# Patient Record
Sex: MALE | Race: WHITE | NOT HISPANIC OR LATINO
[De-identification: names, ages, dates, MRNs, and addresses within clinical notes are randomized per-mention and may not be internally consistent; named-entity substitution may affect disease eponyms.]

---

## 2019-02-09 ENCOUNTER — TRANSCRIPTION ENCOUNTER (OUTPATIENT)
Age: 64
End: 2019-02-09

## 2023-02-27 PROBLEM — Z00.00 ENCOUNTER FOR PREVENTIVE HEALTH EXAMINATION: Status: ACTIVE | Noted: 2023-02-27

## 2023-04-06 ENCOUNTER — NON-APPOINTMENT (OUTPATIENT)
Age: 68
End: 2023-04-06

## 2023-04-10 ENCOUNTER — APPOINTMENT (OUTPATIENT)
Dept: OTOLARYNGOLOGY | Facility: CLINIC | Age: 68
End: 2023-04-10
Payer: COMMERCIAL

## 2023-04-17 ENCOUNTER — NON-APPOINTMENT (OUTPATIENT)
Age: 68
End: 2023-04-17

## 2023-04-17 ENCOUNTER — APPOINTMENT (OUTPATIENT)
Dept: OTOLARYNGOLOGY | Facility: CLINIC | Age: 68
End: 2023-04-17
Payer: COMMERCIAL

## 2023-04-17 VITALS — BODY MASS INDEX: 30.31 KG/M2 | HEIGHT: 68.11 IN | WEIGHT: 200 LBS

## 2023-04-17 DIAGNOSIS — H61.21 IMPACTED CERUMEN, RIGHT EAR: ICD-10-CM

## 2023-04-17 DIAGNOSIS — H93.8X3 OTHER SPECIFIED DISORDERS OF EAR, BILATERAL: ICD-10-CM

## 2023-04-17 PROCEDURE — 99204 OFFICE O/P NEW MOD 45 MIN: CPT | Mod: 25

## 2023-04-17 PROCEDURE — G0268 REMOVAL OF IMPACTED WAX MD: CPT

## 2023-04-17 PROCEDURE — 92557 COMPREHENSIVE HEARING TEST: CPT

## 2023-04-17 PROCEDURE — 92550 TYMPANOMETRY & REFLEX THRESH: CPT

## 2023-04-17 RX ORDER — CIPROFLOXACIN AND DEXAMETHASONE 3; 1 MG/ML; MG/ML
0.3-0.1 SUSPENSION/ DROPS AURICULAR (OTIC) TWICE DAILY
Qty: 1 | Refills: 1 | Status: ACTIVE | COMMUNITY
Start: 2023-04-17 | End: 1900-01-01

## 2023-04-17 NOTE — HISTORY OF PRESENT ILLNESS
[de-identified] : Patient presents today c/o b/l hearing loss, left ear otalgia ,  Patient states for a few months both his ears feel clogged and he can not hear well .  He has some left ear otalgia and drainage.

## 2023-04-17 NOTE — REASON FOR VISIT
[Initial Evaluation] : an initial evaluation for [FreeTextEntry2] : b/l hearing loss, left ear otalgia ,

## 2023-05-18 ENCOUNTER — APPOINTMENT (OUTPATIENT)
Dept: OTOLARYNGOLOGY | Facility: CLINIC | Age: 68
End: 2023-05-18
Payer: COMMERCIAL

## 2023-05-18 DIAGNOSIS — H90.3 SENSORINEURAL HEARING LOSS, BILATERAL: ICD-10-CM

## 2023-05-18 DIAGNOSIS — H60.392 OTHER INFECTIVE OTITIS EXTERNA, LEFT EAR: ICD-10-CM

## 2023-05-18 PROCEDURE — 99213 OFFICE O/P EST LOW 20 MIN: CPT

## 2023-05-18 NOTE — REASON FOR VISIT
[Subsequent Evaluation] : a subsequent evaluation for [FreeTextEntry2] : b/l hearing loss, left otalgia, hoarseness

## 2023-05-18 NOTE — ASSESSMENT
[FreeTextEntry1] : AOE resolved\par Declines HAs\par Discussed dry ear precautions and indications to return

## 2023-05-18 NOTE — HISTORY OF PRESENT ILLNESS
[de-identified] : Pt states the ear pain has resolved but that he woke up with some hoarseness this morning.

## 2024-09-13 ENCOUNTER — NON-APPOINTMENT (OUTPATIENT)
Age: 69
End: 2024-09-13

## 2024-09-14 ENCOUNTER — INPATIENT (INPATIENT)
Facility: HOSPITAL | Age: 69
LOS: 2 days | Discharge: HOME | DRG: 305 | End: 2024-09-17
Attending: HOSPITALIST | Admitting: STUDENT IN AN ORGANIZED HEALTH CARE EDUCATION/TRAINING PROGRAM
Payer: COMMERCIAL

## 2024-09-14 VITALS
RESPIRATION RATE: 18 BRPM | HEART RATE: 111 BPM | OXYGEN SATURATION: 96 % | SYSTOLIC BLOOD PRESSURE: 216 MMHG | TEMPERATURE: 98 F | DIASTOLIC BLOOD PRESSURE: 110 MMHG

## 2024-09-14 LAB
ALBUMIN SERPL ELPH-MCNC: 4.3 G/DL — SIGNIFICANT CHANGE UP (ref 3.5–5.2)
ALP SERPL-CCNC: 59 U/L — SIGNIFICANT CHANGE UP (ref 30–115)
ALT FLD-CCNC: 15 U/L — SIGNIFICANT CHANGE UP (ref 0–41)
ANION GAP SERPL CALC-SCNC: 11 MMOL/L — SIGNIFICANT CHANGE UP (ref 7–14)
AST SERPL-CCNC: 19 U/L — SIGNIFICANT CHANGE UP (ref 0–41)
BASOPHILS # BLD AUTO: 0.03 K/UL — SIGNIFICANT CHANGE UP (ref 0–0.2)
BASOPHILS NFR BLD AUTO: 0.4 % — SIGNIFICANT CHANGE UP (ref 0–1)
BILIRUB SERPL-MCNC: 0.2 MG/DL — SIGNIFICANT CHANGE UP (ref 0.2–1.2)
BUN SERPL-MCNC: 19 MG/DL — SIGNIFICANT CHANGE UP (ref 10–20)
CALCIUM SERPL-MCNC: 9.3 MG/DL — SIGNIFICANT CHANGE UP (ref 8.4–10.5)
CHLORIDE SERPL-SCNC: 108 MMOL/L — SIGNIFICANT CHANGE UP (ref 98–110)
CO2 SERPL-SCNC: 25 MMOL/L — SIGNIFICANT CHANGE UP (ref 17–32)
CREAT SERPL-MCNC: 1.3 MG/DL — SIGNIFICANT CHANGE UP (ref 0.7–1.5)
EGFR: 60 ML/MIN/1.73M2 — SIGNIFICANT CHANGE UP
EOSINOPHIL # BLD AUTO: 0.12 K/UL — SIGNIFICANT CHANGE UP (ref 0–0.7)
EOSINOPHIL NFR BLD AUTO: 1.7 % — SIGNIFICANT CHANGE UP (ref 0–8)
GLUCOSE SERPL-MCNC: 109 MG/DL — HIGH (ref 70–99)
HCT VFR BLD CALC: 42 % — SIGNIFICANT CHANGE UP (ref 42–52)
HGB BLD-MCNC: 13.9 G/DL — LOW (ref 14–18)
IMM GRANULOCYTES NFR BLD AUTO: 0.1 % — SIGNIFICANT CHANGE UP (ref 0.1–0.3)
LYMPHOCYTES # BLD AUTO: 2.02 K/UL — SIGNIFICANT CHANGE UP (ref 1.2–3.4)
LYMPHOCYTES # BLD AUTO: 28.7 % — SIGNIFICANT CHANGE UP (ref 20.5–51.1)
MCHC RBC-ENTMCNC: 30.6 PG — SIGNIFICANT CHANGE UP (ref 27–31)
MCHC RBC-ENTMCNC: 33.1 G/DL — SIGNIFICANT CHANGE UP (ref 32–37)
MCV RBC AUTO: 92.5 FL — SIGNIFICANT CHANGE UP (ref 80–94)
MONOCYTES # BLD AUTO: 0.84 K/UL — HIGH (ref 0.1–0.6)
MONOCYTES NFR BLD AUTO: 11.9 % — HIGH (ref 1.7–9.3)
NEUTROPHILS # BLD AUTO: 4.03 K/UL — SIGNIFICANT CHANGE UP (ref 1.4–6.5)
NEUTROPHILS NFR BLD AUTO: 57.2 % — SIGNIFICANT CHANGE UP (ref 42.2–75.2)
NRBC # BLD: 0 /100 WBCS — SIGNIFICANT CHANGE UP (ref 0–0)
PLATELET # BLD AUTO: 238 K/UL — SIGNIFICANT CHANGE UP (ref 130–400)
PMV BLD: 10.7 FL — HIGH (ref 7.4–10.4)
POTASSIUM SERPL-MCNC: 4.2 MMOL/L — SIGNIFICANT CHANGE UP (ref 3.5–5)
POTASSIUM SERPL-SCNC: 4.2 MMOL/L — SIGNIFICANT CHANGE UP (ref 3.5–5)
PROT SERPL-MCNC: 6.9 G/DL — SIGNIFICANT CHANGE UP (ref 6–8)
RBC # BLD: 4.54 M/UL — LOW (ref 4.7–6.1)
RBC # FLD: 13.4 % — SIGNIFICANT CHANGE UP (ref 11.5–14.5)
SODIUM SERPL-SCNC: 144 MMOL/L — SIGNIFICANT CHANGE UP (ref 135–146)
TROPONIN T, HIGH SENSITIVITY RESULT: 22 NG/L — HIGH (ref 6–21)
WBC # BLD: 7.05 K/UL — SIGNIFICANT CHANGE UP (ref 4.8–10.8)
WBC # FLD AUTO: 7.05 K/UL — SIGNIFICANT CHANGE UP (ref 4.8–10.8)

## 2024-09-14 PROCEDURE — 99285 EMERGENCY DEPT VISIT HI MDM: CPT

## 2024-09-14 PROCEDURE — 93010 ELECTROCARDIOGRAM REPORT: CPT

## 2024-09-14 RX ORDER — HYDRALAZINE HCL 50 MG
25 TABLET ORAL ONCE
Refills: 0 | Status: COMPLETED | OUTPATIENT
Start: 2024-09-14 | End: 2024-09-14

## 2024-09-14 RX ORDER — NICARDIPINE HCL 20 MG
5 CAPSULE ORAL
Qty: 40 | Refills: 0 | Status: DISCONTINUED | OUTPATIENT
Start: 2024-09-14 | End: 2024-09-15

## 2024-09-14 RX ORDER — HYDRALAZINE HCL 50 MG
10 TABLET ORAL ONCE
Refills: 0 | Status: COMPLETED | OUTPATIENT
Start: 2024-09-14 | End: 2024-09-14

## 2024-09-14 RX ADMIN — Medication 25 MILLIGRAM(S): at 20:58

## 2024-09-14 RX ADMIN — Medication 25 MG/HR: at 23:37

## 2024-09-14 RX ADMIN — Medication 10 MILLIGRAM(S): at 22:44

## 2024-09-14 NOTE — ED ADULT NURSE NOTE - NSFALLUNIVINTERV_ED_ALL_ED
Assistance with ambulation/Communicate risk of Fall with Harm to all staff, patient, and family/Monitor gait and stability/Provide visual cue: red socks, yellow wristband, yellow gown, etc/Review medications for side effects contributing to fall risk/Bed/Stretcher in lowest position, wheels locked, appropriate side rails in place/Call bell, personal items and telephone in reach/Instruct patient to call for assistance before getting out of bed/chair/stretcher/Non-slip footwear applied when patient is off stretcher/[place] to call system/Physically safe environment - no spills, clutter or unnecessary equipment/Purposeful proactive rounding/Room/bathroom lighting operational, light cord in reach

## 2024-09-14 NOTE — ED PROVIDER NOTE - NS ED ATTENDING STATEMENT MOD
I have seen and examined this patient and fully participated in the care of this patient as the teaching attending.  The service was shared with the PRICILA.  I reviewed and verified the documentation.

## 2024-09-14 NOTE — ED ADULT NURSE NOTE - OBJECTIVE STATEMENT
pt presents with HTN, states he takes PO meds 3x daily but 3 hours ago, BP was too high. states he had L arm tingling and a headache that has minimized. pt denies chest pain and has slight dizziness. pt a/ox4. states he took his night PO meds before coming. pt placed on cardiac monitor

## 2024-09-14 NOTE — ED PROVIDER NOTE - OBJECTIVE STATEMENT
69-year-old male with a past medical history of hypertension, and history of renal cyst presents to the ED for evaluation of elevated blood pressure and headache that began today.  Patient reports his blood pressure is normally 140/90.  Patient reports he checked his blood pressure today and it was 210/120.  Patient reports that around 5:30 PM while he was watching TV he developed a headache to the bilateral temples but he took benicar? and his headache resolved.  Patient reports he usually takes hydralazine 25 mg 3 times a day and is due for the next dose at 9 PM, lisinopril 5 mg 1 time a day, and losartan 50 mg 1 time a day.  Patient reports he is compliant with this medication.  Patient reports he has an appointment with the nephrologist for the first time on September 18 in [place].  Patient reports intermittent left arm tingling x 1 week.  Patient denies visual changes, ringing in the ears, recent trauma, use of blood thinners, weakness, numbness, urinary or bowel retention or incontinence, chest pain, shortness of breath, abdominal pain, nausea, vomiting, diarrhea, or constipation.

## 2024-09-14 NOTE — ED PROVIDER NOTE - PHYSICAL EXAMINATION
Physical Exam    Vital Signs: I have reviewed the initial vital signs.  Constitutional: well-nourished, appears stated age, no acute distress  Eyes: Conjunctiva pink, Sclera clear, PERRLA, EOMI without pain. no nystagmus.   Cardiovascular: regular rate, regular rhythm, well-perfused extremities, radial pulses equal and 2+ b/l.   Respiratory: unlabored respiratory effort, clear to auscultation bilaterally no wheezing, rales and rhonchi. pt is speaking full sentences. no accessory muscle use.   Gastrointestinal: soft, non-tender, nondistended abdomen, no pulsatile mass, no rebound, no guarding  Musculoskeletal: no lower extremity edema, no calf tenderness, FROM of b/l upper and lower extremities  Integumentary: warm, dry, no rash  Neurologic: awake, alert, cranial nerves II-XII grossly intact, extremities’ motor and sensory functions grossly intact. finger to nose intact. negative pronator drift. 5/5 strength throughout.   Psychiatric: appropriate mood, appropriate affect

## 2024-09-14 NOTE — ED PROVIDER NOTE - CLINICAL SUMMARY MEDICAL DECISION MAKING FREE TEXT BOX
Patient with PCKD presented with elevated blood pressure and a headache that is now resolved.  Given p.o. medications and IV medications however blood pressure trended upwards.  Started on nicardipine drip with good result.  Patient's troponins stable from 20-22.  EKG without any signs of heart strain or ischemia.  Patient to be admitted for hypertensive urgency and further cardiac evaluation    Attending Statement: I have personally provided the amount of critical care time documented below excluding time spent on separate procedures.     Critical Care Time Spent (min) Must be 30 or more minutes to qualify: 35. Patient with PCKD presented with elevated blood pressure and a headache that is now resolved.  Given p.o. medications and IV medications however blood pressure trended upwards.  Started on nicardipine drip with good result.  Patient's troponins stable from 20-22.  EKG without any signs of heart strain or ischemia.  Patient to be admitted for hypertensive urgency and further cardiac evaluation

## 2024-09-14 NOTE — ED PROVIDER NOTE - ATTENDING CONTRIBUTION TO CARE
I saw and evaluated the patient on my own and made amendments to the Mid-level provider's documentation as necessary. Briefly, I have the following impression and plan...    symptomatic HTN, labs and bp control    Please see my MDM for further details.

## 2024-09-15 DIAGNOSIS — I16.1 HYPERTENSIVE EMERGENCY: ICD-10-CM

## 2024-09-15 LAB
ALBUMIN SERPL ELPH-MCNC: 4.1 G/DL — SIGNIFICANT CHANGE UP (ref 3.5–5.2)
ALP SERPL-CCNC: 55 U/L — SIGNIFICANT CHANGE UP (ref 30–115)
ALT FLD-CCNC: 14 U/L — SIGNIFICANT CHANGE UP (ref 0–41)
ANION GAP SERPL CALC-SCNC: 9 MMOL/L — SIGNIFICANT CHANGE UP (ref 7–14)
AST SERPL-CCNC: 19 U/L — SIGNIFICANT CHANGE UP (ref 0–41)
BASOPHILS # BLD AUTO: 0.05 K/UL — SIGNIFICANT CHANGE UP (ref 0–0.2)
BASOPHILS NFR BLD AUTO: 0.7 % — SIGNIFICANT CHANGE UP (ref 0–1)
BILIRUB SERPL-MCNC: 0.4 MG/DL — SIGNIFICANT CHANGE UP (ref 0.2–1.2)
BUN SERPL-MCNC: 16 MG/DL — SIGNIFICANT CHANGE UP (ref 10–20)
CALCIUM SERPL-MCNC: 9 MG/DL — SIGNIFICANT CHANGE UP (ref 8.4–10.4)
CHLORIDE SERPL-SCNC: 105 MMOL/L — SIGNIFICANT CHANGE UP (ref 98–110)
CHOLEST SERPL-MCNC: 181 MG/DL — SIGNIFICANT CHANGE UP
CO2 SERPL-SCNC: 26 MMOL/L — SIGNIFICANT CHANGE UP (ref 17–32)
CREAT SERPL-MCNC: 1.3 MG/DL — SIGNIFICANT CHANGE UP (ref 0.7–1.5)
EGFR: 60 ML/MIN/1.73M2 — SIGNIFICANT CHANGE UP
EOSINOPHIL # BLD AUTO: 0.17 K/UL — SIGNIFICANT CHANGE UP (ref 0–0.7)
EOSINOPHIL NFR BLD AUTO: 2.3 % — SIGNIFICANT CHANGE UP (ref 0–8)
GLUCOSE SERPL-MCNC: 99 MG/DL — SIGNIFICANT CHANGE UP (ref 70–99)
HCT VFR BLD CALC: 43 % — SIGNIFICANT CHANGE UP (ref 42–52)
HDLC SERPL-MCNC: 52 MG/DL — SIGNIFICANT CHANGE UP
HGB BLD-MCNC: 14.1 G/DL — SIGNIFICANT CHANGE UP (ref 14–18)
IMM GRANULOCYTES NFR BLD AUTO: 0.1 % — SIGNIFICANT CHANGE UP (ref 0.1–0.3)
LIPID PNL WITH DIRECT LDL SERPL: 116 MG/DL — HIGH
LYMPHOCYTES # BLD AUTO: 1.81 K/UL — SIGNIFICANT CHANGE UP (ref 1.2–3.4)
LYMPHOCYTES # BLD AUTO: 24.7 % — SIGNIFICANT CHANGE UP (ref 20.5–51.1)
MAGNESIUM SERPL-MCNC: 2 MG/DL — SIGNIFICANT CHANGE UP (ref 1.8–2.4)
MCHC RBC-ENTMCNC: 30.7 PG — SIGNIFICANT CHANGE UP (ref 27–31)
MCHC RBC-ENTMCNC: 32.8 G/DL — SIGNIFICANT CHANGE UP (ref 32–37)
MCV RBC AUTO: 93.5 FL — SIGNIFICANT CHANGE UP (ref 80–94)
MONOCYTES # BLD AUTO: 0.92 K/UL — HIGH (ref 0.1–0.6)
MONOCYTES NFR BLD AUTO: 12.5 % — HIGH (ref 1.7–9.3)
NEUTROPHILS # BLD AUTO: 4.38 K/UL — SIGNIFICANT CHANGE UP (ref 1.4–6.5)
NEUTROPHILS NFR BLD AUTO: 59.7 % — SIGNIFICANT CHANGE UP (ref 42.2–75.2)
NON HDL CHOLESTEROL: 129 MG/DL — SIGNIFICANT CHANGE UP
NRBC # BLD: 0 /100 WBCS — SIGNIFICANT CHANGE UP (ref 0–0)
PLATELET # BLD AUTO: 223 K/UL — SIGNIFICANT CHANGE UP (ref 130–400)
PMV BLD: 10.8 FL — HIGH (ref 7.4–10.4)
POTASSIUM SERPL-MCNC: 4.1 MMOL/L — SIGNIFICANT CHANGE UP (ref 3.5–5)
POTASSIUM SERPL-SCNC: 4.1 MMOL/L — SIGNIFICANT CHANGE UP (ref 3.5–5)
PROT SERPL-MCNC: 6.5 G/DL — SIGNIFICANT CHANGE UP (ref 6–8)
RBC # BLD: 4.6 M/UL — LOW (ref 4.7–6.1)
RBC # FLD: 13.3 % — SIGNIFICANT CHANGE UP (ref 11.5–14.5)
SODIUM SERPL-SCNC: 140 MMOL/L — SIGNIFICANT CHANGE UP (ref 135–146)
TRIGL SERPL-MCNC: 63 MG/DL — SIGNIFICANT CHANGE UP
TROPONIN T, HIGH SENSITIVITY RESULT: 21 NG/L — SIGNIFICANT CHANGE UP (ref 6–21)
TROPONIN T, HIGH SENSITIVITY RESULT: 22 NG/L — HIGH (ref 6–21)
TROPONIN T, HIGH SENSITIVITY RESULT: 23 NG/L — HIGH (ref 6–21)
WBC # BLD: 7.34 K/UL — SIGNIFICANT CHANGE UP (ref 4.8–10.8)
WBC # FLD AUTO: 7.34 K/UL — SIGNIFICANT CHANGE UP (ref 4.8–10.8)

## 2024-09-15 PROCEDURE — 76770 US EXAM ABDO BACK WALL COMP: CPT | Mod: 26

## 2024-09-15 PROCEDURE — 93005 ELECTROCARDIOGRAM TRACING: CPT

## 2024-09-15 PROCEDURE — 93306 TTE W/DOPPLER COMPLETE: CPT

## 2024-09-15 PROCEDURE — 80048 BASIC METABOLIC PNL TOTAL CA: CPT

## 2024-09-15 PROCEDURE — 36415 COLL VENOUS BLD VENIPUNCTURE: CPT

## 2024-09-15 PROCEDURE — 76770 US EXAM ABDO BACK WALL COMP: CPT

## 2024-09-15 PROCEDURE — 71045 X-RAY EXAM CHEST 1 VIEW: CPT | Mod: 26

## 2024-09-15 PROCEDURE — 70551 MRI BRAIN STEM W/O DYE: CPT | Mod: MC

## 2024-09-15 PROCEDURE — 80053 COMPREHEN METABOLIC PANEL: CPT

## 2024-09-15 PROCEDURE — 84100 ASSAY OF PHOSPHORUS: CPT

## 2024-09-15 PROCEDURE — 71045 X-RAY EXAM CHEST 1 VIEW: CPT

## 2024-09-15 PROCEDURE — 99223 1ST HOSP IP/OBS HIGH 75: CPT

## 2024-09-15 PROCEDURE — 80061 LIPID PANEL: CPT

## 2024-09-15 PROCEDURE — 93010 ELECTROCARDIOGRAM REPORT: CPT

## 2024-09-15 PROCEDURE — 93017 CV STRESS TEST TRACING ONLY: CPT

## 2024-09-15 PROCEDURE — 83036 HEMOGLOBIN GLYCOSYLATED A1C: CPT

## 2024-09-15 PROCEDURE — 74175 CTA ABDOMEN W/CONTRAST: CPT | Mod: MC

## 2024-09-15 PROCEDURE — 85025 COMPLETE CBC W/AUTO DIFF WBC: CPT

## 2024-09-15 PROCEDURE — 78452 HT MUSCLE IMAGE SPECT MULT: CPT | Mod: MC

## 2024-09-15 PROCEDURE — 84484 ASSAY OF TROPONIN QUANT: CPT

## 2024-09-15 PROCEDURE — 70450 CT HEAD/BRAIN W/O DYE: CPT | Mod: 26

## 2024-09-15 PROCEDURE — 83735 ASSAY OF MAGNESIUM: CPT

## 2024-09-15 PROCEDURE — 70450 CT HEAD/BRAIN W/O DYE: CPT | Mod: MC

## 2024-09-15 PROCEDURE — A9500: CPT

## 2024-09-15 PROCEDURE — 84443 ASSAY THYROID STIM HORMONE: CPT

## 2024-09-15 PROCEDURE — 93976 VASCULAR STUDY: CPT

## 2024-09-15 RX ORDER — ENOXAPARIN SODIUM 100 MG/ML
40 INJECTION SUBCUTANEOUS EVERY 24 HOURS
Refills: 0 | Status: DISCONTINUED | OUTPATIENT
Start: 2024-09-15 | End: 2024-09-17

## 2024-09-15 RX ORDER — ISOSORBIDE DINITRATE 10 MG
5 TABLET ORAL THREE TIMES A DAY
Refills: 0 | Status: DISCONTINUED | OUTPATIENT
Start: 2024-09-15 | End: 2024-09-15

## 2024-09-15 RX ORDER — HYDRALAZINE HCL 50 MG
25 TABLET ORAL THREE TIMES A DAY
Refills: 0 | Status: DISCONTINUED | OUTPATIENT
Start: 2024-09-15 | End: 2024-09-17

## 2024-09-15 RX ORDER — NIFEDIPINE 60 MG/1
60 TABLET, FILM COATED, EXTENDED RELEASE ORAL DAILY
Refills: 0 | Status: DISCONTINUED | OUTPATIENT
Start: 2024-09-16 | End: 2024-09-17

## 2024-09-15 RX ORDER — NIFEDIPINE 60 MG/1
30 TABLET, FILM COATED, EXTENDED RELEASE ORAL ONCE
Refills: 0 | Status: COMPLETED | OUTPATIENT
Start: 2024-09-15 | End: 2024-09-15

## 2024-09-15 RX ORDER — NIFEDIPINE 60 MG/1
30 TABLET, FILM COATED, EXTENDED RELEASE ORAL DAILY
Refills: 0 | Status: DISCONTINUED | OUTPATIENT
Start: 2024-09-15 | End: 2024-09-15

## 2024-09-15 RX ORDER — LOSARTAN POTASSIUM 50 MG/1
50 TABLET ORAL EVERY 12 HOURS
Refills: 0 | Status: DISCONTINUED | OUTPATIENT
Start: 2024-09-15 | End: 2024-09-17

## 2024-09-15 RX ADMIN — Medication 25 MILLIGRAM(S): at 14:22

## 2024-09-15 RX ADMIN — NIFEDIPINE 30 MILLIGRAM(S): 60 TABLET, FILM COATED, EXTENDED RELEASE ORAL at 11:36

## 2024-09-15 RX ADMIN — Medication 25 MILLIGRAM(S): at 04:23

## 2024-09-15 RX ADMIN — Medication 25 MILLIGRAM(S): at 22:25

## 2024-09-15 RX ADMIN — Medication 5 MILLIGRAM(S): at 11:35

## 2024-09-15 RX ADMIN — NIFEDIPINE 30 MILLIGRAM(S): 60 TABLET, FILM COATED, EXTENDED RELEASE ORAL at 05:41

## 2024-09-15 RX ADMIN — LOSARTAN POTASSIUM 50 MILLIGRAM(S): 50 TABLET ORAL at 17:41

## 2024-09-15 NOTE — H&P ADULT - NSHPPHYSICALEXAM_GEN_ALL_CORE
T(C): 36.7 (09-15-24 @ 07:42), Max: 36.9 (09-14-24 @ 19:54)  HR: 50 (09-15-24 @ 07:42) (50 - 111)  BP: 181/92 (09-15-24 @ 07:42) (143/76 - 216/110)  RR: 19 (09-15-24 @ 07:42) (16 - 19)  SpO2: 97% (09-15-24 @ 07:42) (96% - 100%)    CONSTITUTIONAL: Well groomed, no apparent distress  RESP: No respiratory distress, no use of accessory muscles; CTA b/l, no WRR  CV: RRR, +S1S2,; no peripheral edema  GI: Soft, NT, ND  SKIN: No rashes or ulcers   PSYCH:  A+O x 3

## 2024-09-15 NOTE — H&P ADULT - HISTORY OF PRESENT ILLNESS
HPI : 69-year-old male with a past medical history of PCKD and HTN, presents to ED for evaluation of elevated blood pressure and headache. Patient reports that around 5:30 PM while he was watching TV he developed a headache to the bilateral temples, checked his blood pressure which was 210/120 and took Olmesartan.  Patient reports his blood pressure is normally 140/90.  . Patient reports intermittent left arm tingling x 1 week.  Patient reports he is compliant with this medication. Patient reports he has an appointment with the nephrologist for the first time on September 18 in [place]. On arrival to ED, BP was 216/110 and patient was started on Nicardipine ggt.  Patient denies visual changes, ringing in the ears, recent trauma, use of blood thinners, weakness, numbness, urinary or bowel retention or incontinence, chest pain, shortness of breath, abdominal pain, nausea, vomiting, diarrhea, or constipation.    Vital Signs Last 24 Hrs  T(C): 36.7 (15 Sep 2024 07:42), Max: 36.9 (14 Sep 2024 19:54)  T(F): 98.1 (15 Sep 2024 07:42), Max: 98.4 (14 Sep 2024 19:54)  HR: 50 (15 Sep 2024 07:42) (50 - 111)  BP: 181/92 (15 Sep 2024 07:42) (143/76 - 216/110)  BP(mean): 124 (15 Sep 2024 00:40) (119 - 130)  ABP(mean): --  RR: 19 (15 Sep 2024 07:42) (16 - 19)  SpO2: 97% (15 Sep 2024 07:42) (96% - 100%)    O2 Parameters below as of 15 Sep 2024 07:42  Patient On (Oxygen Delivery Method): room air      EKG:    Labs: Trop 22> 22> 23     CT Head: No evidence of acute intracranial pathology.    Patient admitted for hypertensive emergency      HPI : 69-year-old male with a past medical history of PCKD and HTN, presents to ED for evaluation of elevated blood pressure and headache. Patient reports that around 5:30 PM while he was watching TV he developed a headache to the bilateral temples, checked his blood pressure which was 210/120 and took Olmesartan.  Patient reports his blood pressure is normally 140/90.  . Patient reports intermittent left arm tingling x 1 week.  Patient reports he is compliant with this medication. Patient reports he has an appointment with the nephrologist for the first time on September 18 in [place]. On arrival to ED, BP was 216/110 and patient was started on Nicardipine ggt.  Patient denies visual changes, ringing in the ears, recent trauma, use of blood thinners, weakness, numbness, urinary or bowel retention or incontinence, chest pain, shortness of breath, abdominal pain, nausea, vomiting, diarrhea, or constipation.    Vital Signs Last 24 Hrs  T(C): 36.7 (15 Sep 2024 07:42), Max: 36.9 (14 Sep 2024 19:54)  T(F): 98.1 (15 Sep 2024 07:42), Max: 98.4 (14 Sep 2024 19:54)  HR: 50 (15 Sep 2024 07:42) (50 - 111)  BP: 181/92 (15 Sep 2024 07:42) (143/76 - 216/110)  BP(mean): 124 (15 Sep 2024 00:40) (119 - 130)  ABP(mean): --  RR: 19 (15 Sep 2024 07:42) (16 - 19)  SpO2: 97% (15 Sep 2024 07:42) (96% - 100%)    O2 Parameters below as of 15 Sep 2024 07:42  Patient On (Oxygen Delivery Method): room air      EKG: Sinus bradycardia with first-degree AV block.  Heart rate 57. No signs of ischemia     Labs: Trop 22> 22> 23     CT Head: No evidence of acute intracranial pathology.    Patient admitted for hypertensive emergency      HPI : 69-year-old male with a past medical history of PCKD and HTN, presents to ED for evaluation of elevated blood pressure and headache. Patient reports that around 5:30 PM while he was watching TV he developed a headache to the bilateral temples, checked his blood pressure which was 210/120 and took Olmesartan.  Patient reports his blood pressure is normally 140/90.  . Patient reports intermittent left arm tingling x 1 week.  Patient reports he is compliant with this medication. Patient reports he has an appointment with the nephrologist for the first time on September 18 in [place]. On arrival to ED, BP was 216/110 and patient was started on Nicardipine ggt.  Patient denies visual changes, ringing in the ears, recent trauma, use of blood thinners, weakness, numbness, urinary or bowel retention or incontinence, chest pain, shortness of breath, abdominal pain, nausea, vomiting, diarrhea, or constipation.    Vital Signs Last 24 Hrs  T(C): 36.7 (15 Sep 2024 07:42), Max: 36.9 (14 Sep 2024 19:54)  T(F): 98.1 (15 Sep 2024 07:42), Max: 98.4 (14 Sep 2024 19:54)  HR: 50 (15 Sep 2024 07:42) (50 - 111)  BP: 181/92 (15 Sep 2024 07:42) (143/76 - 216/110)  BP(mean): 124 (15 Sep 2024 00:40) (119 - 130)  ABP(mean): --  RR: 19 (15 Sep 2024 07:42) (16 - 19)  SpO2: 97% (15 Sep 2024 07:42) (96% - 100%)    O2 Parameters below as of 15 Sep 2024 07:42  Patient On (Oxygen Delivery Method): room air      EKG: Sinus bradycardia with first-degree AV block.  Heart rate 57. Left anterior fascicular block Left ventricular hypertrophy ( R in aVL , Steve product , Romhilt-Ybarra )    Labs: Trop 22> 22> 23     CT Head: No evidence of acute intracranial pathology.    Patient admitted for hypertensive emergency      HPI : 69-year-old male with a past medical history of PCKD, Hx of Bladder Ca s/p Resection, BPH,  and HTN, presents to ED for evaluation of elevated blood pressure and headache. Patient reports that around 5:30 PM while he was watching TV he developed a headache to the bilateral temples, checked his blood pressure which was 210/120 and took Benicar (states this is a medication he was prescribed >20 yrs ago but no longer takes). Patient reports his blood pressure is normally 140/90.  . Patient reports intermittent left arm tingling x 1 week and episode of B/L L swelling last week that resolved on its own.  Patient reports he is taking Hydralazine 25 TID, Lisinopril 5mg, and Losartan 50mg daily and is compliant with this medication.  Patient reports he has an appointment with the nephrologist for the first time on September 18 in [place]. On arrival to ED, BP was 216/110 and patient was started on Nicardipine ggt.  Patient denies visual changes, ringing in the ears, recent trauma, use of blood thinners, weakness, numbness, urinary or bowel retention or incontinence, chest pain, shortness of breath, abdominal pain, nausea, vomiting, diarrhea, or constipation.    Vital Signs Last 24 Hrs  T(C): 36.7 (15 Sep 2024 07:42), Max: 36.9 (14 Sep 2024 19:54)  T(F): 98.1 (15 Sep 2024 07:42), Max: 98.4 (14 Sep 2024 19:54)  HR: 50 (15 Sep 2024 07:42) (50 - 111)  BP: 181/92 (15 Sep 2024 07:42) (143/76 - 216/110)  BP(mean): 124 (15 Sep 2024 00:40) (119 - 130)  ABP(mean): --  RR: 19 (15 Sep 2024 07:42) (16 - 19)  SpO2: 97% (15 Sep 2024 07:42) (96% - 100%)    O2 Parameters below as of 15 Sep 2024 07:42  Patient On (Oxygen Delivery Method): room air      EKG: Sinus bradycardia with first-degree AV block.  Heart rate 57. Left anterior fascicular block Left ventricular hypertrophy ( R in aVL , Steve product , Romhilt-Ybarra )    Labs: Trop 22> 22> 23     CT Head: No evidence of acute intracranial pathology.    Patient admitted to telemetry for hypertensive emergency

## 2024-09-15 NOTE — H&P ADULT - ASSESSMENT
69-year-old male with a past medical history of PCKD , Hx of Bladder Ca s/p Resection, BPH,  and HTN,  presents to ED for evaluation of elevated blood pressure and headache. Patient admitted to telemetry for hypertensive emergency.    #HTN Emergency  with headache and L arm tingling  #Polycystic Kidney disease   -/110 on presentation   -CT Head neg  -Trop 22> 22> 23   -EKG: Sinus bradycardia with first-degree AV block.  Heart rate 57. Left anterior fascicular block Left ventricular hypertrophy ( R in aVL , [place] product , Romhilt-Ybarra )  -s/p Nicardipine ggt   - Patient reports he is taking Hydralazine 25 TID, Lisinopril 5mg, and Losartan 50mg daily  -stop Lisinopril, start Nifedipine 60mg, Losartan 50mg BID,  continue Hydralazine 25mg TID   -Monitor BP  -Check Echo given fasicular block and reported episode of B/L leg swelling 1 week ago   -A1C, Lipid profile , TSH      #PCKD  #Renal calculi  #Hx  BPH   -Cr 1.3   -RBUS : Multiple renal cysts bilaterally. Multiple nonobstructing left renal calculi largest   measuring up to 0.5 cm in the lower pole. No hydronephrosis. Enlarged prostate gland with mass effect on the bladder base.  -pt follows closely with outpt Urologist, needs outpt fu after dc  -Nephro consult       #Hx of Bladder Ca s/p Resection   -pt follows closely with outpt urologist   -outpt Fu       #DVT ppx- Lovenox  #Diet-dash/tlc |  #Activity- IAT  #Dispo-24- 48 hrs     Pending: BP control , repeat EKG, Echo, Nephro 69-year-old male with a past medical history of PCKD , Hx of Bladder Ca s/p Resection, BPH,  and HTN,  presents to ED for evaluation of elevated blood pressure and headache. Patient admitted to telemetry for hypertensive emergency.    #HTN Emergency  with headache and L arm tingling  #Polycystic Kidney disease   -/110 on presentation   -CT Head neg  -Trop 22> 22> 23   -EKG: Sinus bradycardia with first-degree AV block.  Heart rate 57. Left anterior fascicular block Left ventricular hypertrophy ( R in aVL , [place] product , Romhilt-Ybarra )  -s/p Nicardipine ggt   - Patient reports he is taking Hydralazine 25 TID, Lisinopril 5mg, and Losartan 50mg daily  -stop Lisinopril, start Nifedipine 60mg, Losartan 50mg BID,  continue Hydralazine 25mg TID   -Monitor BP  -Check Echo given fasicular block and reported episode of B/L leg swelling 1 week ago   -A1C, Lipid profile , TSH      #PCKD  #Renal calculi  #Hx  BPH   -Cr 1.3   -RBUS : Multiple renal cysts bilaterally. Multiple nonobstructing left renal calculi largest   measuring up to 0.5 cm in the lower pole. No hydronephrosis. Enlarged prostate gland with mass effect on the bladder base.  -pt follows closely with outpt Urologist, needs outpt fu after dc  -Nephro consult Dr. Hilliard      #Hx of Bladder Ca s/p Resection   -pt follows closely with outpt urologist   -outpt Fu       #DVT ppx- Lovenox  #Diet-dash/tlc |  #Activity- IAT  #Dispo-24- 48 hrs     Pending: BP control , repeat EKG, Echo, Nephro

## 2024-09-15 NOTE — PATIENT PROFILE ADULT - FALL HARM RISK - RISK INTERVENTIONS

## 2024-09-15 NOTE — CONSULT NOTE ADULT - SUBJECTIVE AND OBJECTIVE BOX
[facility] INITIAL CONSULT NOTE  --------------------------------------------------------------------------------  HPI:  69-year-old male with a past medical history of PCKD/CKD3, Hx of Bladder Ca s/p Resection, BPH,  and HTN, presents to ED for evaluation of elevated blood pressure and headache. Patient reports that around 5:30 PM while he was watching TV he developed a headache to the bilateral temples, checked his blood pressure which was 210/120 and took Benicar (states this is a medication he was prescribed >20 yrs ago but no longer takes). Patient reports his blood pressure is normally 140/90.  . Patient reports intermittent left arm tingling x 1 week and episode of B/L L swelling last week that resolved on its own.  Patient reports he is taking Hydralazine 25 TID, Lisinopril 5mg, and Losartan 50mg daily and is compliant with this medication. On arrival to ED, BP was 216/110 and patient was started on Nicardipine ggt.  Patient admitted to telemetry for hypertensive emergency     PAST HISTORY  --------------------------------------------------------------------------------  PAST MEDICAL & SURGICAL HISTORY:  PCKD  CKD stage 3a  Bladder Ca  BPH  HTN    FAMILY HISTORY:  No pertinent family history in first degree relatives    SOCIAL HISTORY:  Denies current ETOH, tobacco, and illicit drug use    ALLERGIES & MEDICATIONS  --------------------------------------------------------------------------------  Allergies    No Known Allergies    Standing Inpatient Medications  enoxaparin Injectable 40 milliGRAM(s) SubCutaneous every 24 hours  hydrALAZINE 25 milliGRAM(s) Oral three times a day  losartan 50 milliGRAM(s) Oral every 12 hours    REVIEW OF SYSTEMS  --------------------------------------------------------------------------------  Gen: No fevers/chills  Skin: No rashes  Head/Eyes/Ears/Mouth: No sinus pain/discomfort, sore throat  Respiratory: No dyspnea, cough, wheezing, hemoptysis  CV: No chest pain, PND, orthopnea  GI: No abdominal pain, diarrhea, constipation, nausea, vomiting, melena, hematochezia  : No increased frequency, dysuria, hematuria, nocturia  All other systems were reviewed and are negative, except as noted.    VITALS/PHYSICAL EXAM  --------------------------------------------------------------------------------  T(C): 36.6 (09-15-24 @ 10:53), Max: 36.9 (09-14-24 @ 19:54)  HR: 62 (09-15-24 @ 14:00) (50 - 111)  BP: 136/81 (09-15-24 @ 14:00) (136/81 - 216/110)  RR: 18 (09-15-24 @ 10:53) (16 - 19)  SpO2: 97% (09-15-24 @ 10:53) (96% - 100%)  Weight (kg): 90.7 (09-15-24 @ 12:14)    Physical Exam:  	Gen: NAD  	Pulm: CTA B/L  	CV: RRR, S1S2  	Back: No CVA tenderness; no sacral edema  	Abd: +BS, soft, nontender/nondistended  	: No suprapubic tenderness  	UE: Warm, no asterixis  	LE: Warm, no edema  	Neuro: AAO x3    LABS/STUDIES  --------------------------------------------------------------------------------              14.1   7.34  >-----------<  223      [09-15-24 @ 06:17]              43.0     140  |  105  |  16  ----------------------------<  99      [09-15-24 @ 06:17]  4.1   |  26  |  1.3        Ca     9.0     [09-15-24 @ 06:17]      Mg     2.0     [09-15-24 @ 06:17]    TPro  6.5  /  Alb  4.1  /  TBili  0.4  /  DBili  x   /  AST  19  /  ALT  14  /  AlkPhos  55  [09-15-24 @ 06:17]    Creatinine Trend:  SCr 1.3 [09-15 @ 06:17]  SCr 1.3 [09-14 @ 21:20]    Urinalysis - [09-15-24 @ 06:17]      Color  / Appearance  / SG  / pH       Gluc 99 / Ketone   / Bili  / Urobili        Blood  / Protein  / Leuk Est  / Nitrite       RBC  / WBC  / Hyaline  / Gran  / Sq Epi  / Non Sq Epi  / Bacteria     Lipid: chol 181, TG 63, HDL 52, LDL --      [09-15-24 @ 12:38]

## 2024-09-15 NOTE — H&P ADULT - NSHPLABSRESULTS_GEN_ALL_CORE
.  LABS:                         14.1   7.34  )-----------( 223      ( 15 Sep 2024 06:17 )             43.0     09-15    140  |  105  |  16  ----------------------------<  99  4.1   |  26  |  1.3    Ca    9.0      15 Sep 2024 06:17  Mg     2.0     09-15    TPro  6.5  /  Alb  4.1  /  TBili  0.4  /  DBili  x   /  AST  19  /  ALT  14  /  AlkPhos  55  09-15      Urinalysis Basic - ( 15 Sep 2024 06:17 )    Color: x / Appearance: x / SG: x / pH: x  Gluc: 99 mg/dL / Ketone: x  / Bili: x / Urobili: x   Blood: x / Protein: x / Nitrite: x   Leuk Esterase: x / RBC: x / WBC x   Sq Epi: x / Non Sq Epi: x / Bacteria: x            RADIOLOGY, EKG & ADDITIONAL TESTS: Reviewed.

## 2024-09-15 NOTE — CONSULT NOTE ADULT - ASSESSMENT
PCKD does not appear to be ADPKD based on history  CKD stage 3a. According to patient, his PCP has informed him that his creatinine has been elevated and has proteinuria.  HTN emergency with headache and L arm tingling  First-degree AV block  H/O bladder Ca with BCG therapy. Undergoes surveillance cystoscopies.  Renal calculi  BPH     Plan:    Agree with ARB only  Monitor BP on current antihypertensive regimen  Likely will need a diuretic at some point  Check UA  Check urine protein creatinine ratio

## 2024-09-15 NOTE — H&P ADULT - ATTENDING COMMENTS
HPI as above.  Interval history: Pt seen and examined at bedside. No cp or sob.   Vital Signs (24 Hrs):  T(C): 36.7 (09-15-24 @ 07:42), Max: 36.9 (09-14-24 @ 19:54)  HR: 50 (09-15-24 @ 07:42) (50 - 111)  BP: 181/92 (09-15-24 @ 07:42) (143/76 - 216/110)  RR: 19 (09-15-24 @ 07:42) (16 - 19)  SpO2: 97% (09-15-24 @ 07:42) (96% - 100%)  Wt(kg): --  Daily     Daily     I&O's Summary    PHYSICAL EXAM:  GENERAL: NAD, well-developed  HEAD:  Atraumatic, Normocephalic  EYES: EOMI, PERRLA, conjunctiva and sclera clear  NECK: Supple, No JVD  CHEST/LUNG: Clear to auscultation bilaterally; No wheeze  HEART: Regular rate and rhythm; No murmurs, rubs, or gallops  ABDOMEN: Soft, Nontender, Nondistended; Bowel sounds present  EXTREMITIES:  2+ Peripheral Pulses, No clubbing, cyanosis, or edema  PSYCH: AAOx3  NEUROLOGY: non-focal  SKIN: No rashes or lesions  Labs reviewed  Imaging reviewed independently and reviewed read  < from: CT Head No Cont (09.15.24 @ 02:34) >    IMPRESSION:  No evidence of acute intracranial pathology.    < end of copied text >      EKG reviewed independently and reviewed read    Plan as above. DW resident in real time. MADe edits. Agree to plan     #Progress Note Handoff  Pending (specify):  monitor BP, follow up nephro, urology outpt, check echo  Family discussion: house staff updated pt family  Disposition: cardiac telemonitoring   Decision to admit the pt is based on acuity as above

## 2024-09-16 ENCOUNTER — RESULT REVIEW (OUTPATIENT)
Age: 69
End: 2024-09-16

## 2024-09-16 LAB
A1C WITH ESTIMATED AVERAGE GLUCOSE RESULT: 5.8 % — HIGH (ref 4–5.6)
ALBUMIN SERPL ELPH-MCNC: 4.3 G/DL — SIGNIFICANT CHANGE UP (ref 3.5–5.2)
ALP SERPL-CCNC: 61 U/L — SIGNIFICANT CHANGE UP (ref 30–115)
ALT FLD-CCNC: 14 U/L — SIGNIFICANT CHANGE UP (ref 0–41)
ANION GAP SERPL CALC-SCNC: 9 MMOL/L — SIGNIFICANT CHANGE UP (ref 7–14)
AST SERPL-CCNC: 16 U/L — SIGNIFICANT CHANGE UP (ref 0–41)
BASOPHILS # BLD AUTO: 0.04 K/UL — SIGNIFICANT CHANGE UP (ref 0–0.2)
BASOPHILS NFR BLD AUTO: 0.5 % — SIGNIFICANT CHANGE UP (ref 0–1)
BILIRUB SERPL-MCNC: 0.4 MG/DL — SIGNIFICANT CHANGE UP (ref 0.2–1.2)
BUN SERPL-MCNC: 18 MG/DL — SIGNIFICANT CHANGE UP (ref 10–20)
CALCIUM SERPL-MCNC: 9.4 MG/DL — SIGNIFICANT CHANGE UP (ref 8.4–10.5)
CHLORIDE SERPL-SCNC: 107 MMOL/L — SIGNIFICANT CHANGE UP (ref 98–110)
CO2 SERPL-SCNC: 26 MMOL/L — SIGNIFICANT CHANGE UP (ref 17–32)
CREAT SERPL-MCNC: 1.3 MG/DL — SIGNIFICANT CHANGE UP (ref 0.7–1.5)
EGFR: 60 ML/MIN/1.73M2 — SIGNIFICANT CHANGE UP
EOSINOPHIL # BLD AUTO: 0.1 K/UL — SIGNIFICANT CHANGE UP (ref 0–0.7)
EOSINOPHIL NFR BLD AUTO: 1.3 % — SIGNIFICANT CHANGE UP (ref 0–8)
ESTIMATED AVERAGE GLUCOSE: 120 MG/DL — HIGH (ref 68–114)
GLUCOSE SERPL-MCNC: 117 MG/DL — HIGH (ref 70–99)
HCT VFR BLD CALC: 46.4 % — SIGNIFICANT CHANGE UP (ref 42–52)
HGB BLD-MCNC: 15.2 G/DL — SIGNIFICANT CHANGE UP (ref 14–18)
IMM GRANULOCYTES NFR BLD AUTO: 0.4 % — HIGH (ref 0.1–0.3)
LYMPHOCYTES # BLD AUTO: 1.9 K/UL — SIGNIFICANT CHANGE UP (ref 1.2–3.4)
LYMPHOCYTES # BLD AUTO: 24 % — SIGNIFICANT CHANGE UP (ref 20.5–51.1)
MAGNESIUM SERPL-MCNC: 1.9 MG/DL — SIGNIFICANT CHANGE UP (ref 1.8–2.4)
MCHC RBC-ENTMCNC: 30.8 PG — SIGNIFICANT CHANGE UP (ref 27–31)
MCHC RBC-ENTMCNC: 32.8 G/DL — SIGNIFICANT CHANGE UP (ref 32–37)
MCV RBC AUTO: 94.1 FL — HIGH (ref 80–94)
MONOCYTES # BLD AUTO: 0.79 K/UL — HIGH (ref 0.1–0.6)
MONOCYTES NFR BLD AUTO: 10 % — HIGH (ref 1.7–9.3)
NEUTROPHILS # BLD AUTO: 5.06 K/UL — SIGNIFICANT CHANGE UP (ref 1.4–6.5)
NEUTROPHILS NFR BLD AUTO: 63.8 % — SIGNIFICANT CHANGE UP (ref 42.2–75.2)
NRBC # BLD: 0 /100 WBCS — SIGNIFICANT CHANGE UP (ref 0–0)
PHOSPHATE SERPL-MCNC: 3.1 MG/DL — SIGNIFICANT CHANGE UP (ref 2.1–4.9)
PLATELET # BLD AUTO: 238 K/UL — SIGNIFICANT CHANGE UP (ref 130–400)
PMV BLD: 10.7 FL — HIGH (ref 7.4–10.4)
POTASSIUM SERPL-MCNC: 4.9 MMOL/L — SIGNIFICANT CHANGE UP (ref 3.5–5)
POTASSIUM SERPL-SCNC: 4.9 MMOL/L — SIGNIFICANT CHANGE UP (ref 3.5–5)
PROT SERPL-MCNC: 7 G/DL — SIGNIFICANT CHANGE UP (ref 6–8)
RBC # BLD: 4.93 M/UL — SIGNIFICANT CHANGE UP (ref 4.7–6.1)
RBC # FLD: 13.4 % — SIGNIFICANT CHANGE UP (ref 11.5–14.5)
SODIUM SERPL-SCNC: 142 MMOL/L — SIGNIFICANT CHANGE UP (ref 135–146)
TSH SERPL-MCNC: 1.23 UIU/ML — SIGNIFICANT CHANGE UP (ref 0.27–4.2)
WBC # BLD: 7.92 K/UL — SIGNIFICANT CHANGE UP (ref 4.8–10.8)
WBC # FLD AUTO: 7.92 K/UL — SIGNIFICANT CHANGE UP (ref 4.8–10.8)

## 2024-09-16 PROCEDURE — 99233 SBSQ HOSP IP/OBS HIGH 50: CPT

## 2024-09-16 PROCEDURE — 93306 TTE W/DOPPLER COMPLETE: CPT | Mod: 26

## 2024-09-16 RX ORDER — REGADENOSON 0.08 MG/ML
0.4 INJECTION, SOLUTION INTRAVENOUS ONCE
Refills: 0 | Status: COMPLETED | OUTPATIENT
Start: 2024-09-16 | End: 2024-09-17

## 2024-09-16 RX ADMIN — Medication 25 MILLIGRAM(S): at 14:06

## 2024-09-16 RX ADMIN — LOSARTAN POTASSIUM 50 MILLIGRAM(S): 50 TABLET ORAL at 18:28

## 2024-09-16 RX ADMIN — Medication 25 MILLIGRAM(S): at 06:14

## 2024-09-16 RX ADMIN — LOSARTAN POTASSIUM 50 MILLIGRAM(S): 50 TABLET ORAL at 06:14

## 2024-09-16 RX ADMIN — NIFEDIPINE 60 MILLIGRAM(S): 60 TABLET, FILM COATED, EXTENDED RELEASE ORAL at 06:14

## 2024-09-16 RX ADMIN — Medication 25 MILLIGRAM(S): at 21:24

## 2024-09-16 NOTE — PROGRESS NOTE ADULT - ATTENDING COMMENTS
#Headache, BL  improving, in setting of HTN urgency  goal 25% reduction today  started on hctz 25  cont nifedipine 60, hydralazine 25 tid; losartan 50 bid  tte with preserved ef, grade I diastolic dysfunction  ekg with first degree avb, L ant fascicular block  NST  cth neg  check mri brain    #Progress Note Handoff  Pending (specify): htn urgency, nst, mri brain  Family discussion: d/w pt at bedside  Disposition: home vs. snf

## 2024-09-17 ENCOUNTER — TRANSCRIPTION ENCOUNTER (OUTPATIENT)
Age: 69
End: 2024-09-17

## 2024-09-17 VITALS
HEART RATE: 60 BPM | TEMPERATURE: 98 F | RESPIRATION RATE: 18 BRPM | SYSTOLIC BLOOD PRESSURE: 154 MMHG | DIASTOLIC BLOOD PRESSURE: 89 MMHG

## 2024-09-17 DIAGNOSIS — Q61.3 POLYCYSTIC KIDNEY, UNSPECIFIED: ICD-10-CM

## 2024-09-17 DIAGNOSIS — Z87.438 PERSONAL HISTORY OF OTHER DISEASES OF MALE GENITAL ORGANS: ICD-10-CM

## 2024-09-17 DIAGNOSIS — Z79.899 OTHER LONG TERM (CURRENT) DRUG THERAPY: ICD-10-CM

## 2024-09-17 DIAGNOSIS — R51.9 HEADACHE, UNSPECIFIED: ICD-10-CM

## 2024-09-17 DIAGNOSIS — C67.9 MALIGNANT NEOPLASM OF BLADDER, UNSPECIFIED: ICD-10-CM

## 2024-09-17 LAB
ANION GAP SERPL CALC-SCNC: 11 MMOL/L — SIGNIFICANT CHANGE UP (ref 7–14)
BASOPHILS # BLD AUTO: 0.05 K/UL — SIGNIFICANT CHANGE UP (ref 0–0.2)
BASOPHILS NFR BLD AUTO: 0.6 % — SIGNIFICANT CHANGE UP (ref 0–1)
BUN SERPL-MCNC: 19 MG/DL — SIGNIFICANT CHANGE UP (ref 10–20)
CALCIUM SERPL-MCNC: 8.9 MG/DL — SIGNIFICANT CHANGE UP (ref 8.4–10.5)
CHLORIDE SERPL-SCNC: 107 MMOL/L — SIGNIFICANT CHANGE UP (ref 98–110)
CO2 SERPL-SCNC: 24 MMOL/L — SIGNIFICANT CHANGE UP (ref 17–32)
CREAT SERPL-MCNC: 1.3 MG/DL — SIGNIFICANT CHANGE UP (ref 0.7–1.5)
EGFR: 60 ML/MIN/1.73M2 — SIGNIFICANT CHANGE UP
EOSINOPHIL # BLD AUTO: 0.16 K/UL — SIGNIFICANT CHANGE UP (ref 0–0.7)
EOSINOPHIL NFR BLD AUTO: 2 % — SIGNIFICANT CHANGE UP (ref 0–8)
GLUCOSE SERPL-MCNC: 97 MG/DL — SIGNIFICANT CHANGE UP (ref 70–99)
HCT VFR BLD CALC: 43.4 % — SIGNIFICANT CHANGE UP (ref 42–52)
HGB BLD-MCNC: 14.4 G/DL — SIGNIFICANT CHANGE UP (ref 14–18)
IMM GRANULOCYTES NFR BLD AUTO: 0.5 % — HIGH (ref 0.1–0.3)
LYMPHOCYTES # BLD AUTO: 2.2 K/UL — SIGNIFICANT CHANGE UP (ref 1.2–3.4)
LYMPHOCYTES # BLD AUTO: 27.1 % — SIGNIFICANT CHANGE UP (ref 20.5–51.1)
MAGNESIUM SERPL-MCNC: 1.9 MG/DL — SIGNIFICANT CHANGE UP (ref 1.8–2.4)
MCHC RBC-ENTMCNC: 30.5 PG — SIGNIFICANT CHANGE UP (ref 27–31)
MCHC RBC-ENTMCNC: 33.2 G/DL — SIGNIFICANT CHANGE UP (ref 32–37)
MCV RBC AUTO: 91.9 FL — SIGNIFICANT CHANGE UP (ref 80–94)
MONOCYTES # BLD AUTO: 0.97 K/UL — HIGH (ref 0.1–0.6)
MONOCYTES NFR BLD AUTO: 11.9 % — HIGH (ref 1.7–9.3)
NEUTROPHILS # BLD AUTO: 4.71 K/UL — SIGNIFICANT CHANGE UP (ref 1.4–6.5)
NEUTROPHILS NFR BLD AUTO: 57.9 % — SIGNIFICANT CHANGE UP (ref 42.2–75.2)
NRBC # BLD: 0 /100 WBCS — SIGNIFICANT CHANGE UP (ref 0–0)
PLATELET # BLD AUTO: 275 K/UL — SIGNIFICANT CHANGE UP (ref 130–400)
PMV BLD: 10.9 FL — HIGH (ref 7.4–10.4)
POTASSIUM SERPL-MCNC: 4 MMOL/L — SIGNIFICANT CHANGE UP (ref 3.5–5)
POTASSIUM SERPL-SCNC: 4 MMOL/L — SIGNIFICANT CHANGE UP (ref 3.5–5)
RBC # BLD: 4.72 M/UL — SIGNIFICANT CHANGE UP (ref 4.7–6.1)
RBC # FLD: 13.3 % — SIGNIFICANT CHANGE UP (ref 11.5–14.5)
SODIUM SERPL-SCNC: 142 MMOL/L — SIGNIFICANT CHANGE UP (ref 135–146)
WBC # BLD: 8.13 K/UL — SIGNIFICANT CHANGE UP (ref 4.8–10.8)
WBC # FLD AUTO: 8.13 K/UL — SIGNIFICANT CHANGE UP (ref 4.8–10.8)

## 2024-09-17 PROCEDURE — 99239 HOSP IP/OBS DSCHRG MGMT >30: CPT

## 2024-09-17 PROCEDURE — 78452 HT MUSCLE IMAGE SPECT MULT: CPT | Mod: 26

## 2024-09-17 PROCEDURE — 70551 MRI BRAIN STEM W/O DYE: CPT | Mod: 26

## 2024-09-17 PROCEDURE — 74175 CTA ABDOMEN W/CONTRAST: CPT | Mod: 26

## 2024-09-17 PROCEDURE — 93975 VASCULAR STUDY: CPT | Mod: 26

## 2024-09-17 PROCEDURE — 93016 CV STRESS TEST SUPVJ ONLY: CPT

## 2024-09-17 PROCEDURE — 93018 CV STRESS TEST I&R ONLY: CPT

## 2024-09-17 RX ORDER — HYDRALAZINE HCL 50 MG
1 TABLET ORAL
Qty: 270 | Refills: 5
Start: 2024-09-17 | End: 2026-03-10

## 2024-09-17 RX ORDER — HYDROCHLOROTHIAZIDE 12.5 MG/1
1 CAPSULE ORAL
Qty: 90 | Refills: 5
Start: 2024-09-17 | End: 2026-03-10

## 2024-09-17 RX ORDER — NIFEDIPINE 60 MG/1
30 TABLET, FILM COATED, EXTENDED RELEASE ORAL ONCE
Refills: 0 | Status: COMPLETED | OUTPATIENT
Start: 2024-09-17 | End: 2024-09-17

## 2024-09-17 RX ORDER — NIFEDIPINE 60 MG/1
90 TABLET, FILM COATED, EXTENDED RELEASE ORAL DAILY
Refills: 0 | Status: DISCONTINUED | OUTPATIENT
Start: 2024-09-18 | End: 2024-09-17

## 2024-09-17 RX ORDER — LOSARTAN POTASSIUM 50 MG/1
1 TABLET ORAL
Qty: 180 | Refills: 5
Start: 2024-09-17 | End: 2026-03-10

## 2024-09-17 RX ORDER — NIFEDIPINE 60 MG/1
1 TABLET, FILM COATED, EXTENDED RELEASE ORAL
Qty: 90 | Refills: 5
Start: 2024-09-17 | End: 2026-03-10

## 2024-09-17 RX ORDER — SODIUM CHLORIDE 9 MG/ML
250 INJECTION INTRAMUSCULAR; INTRAVENOUS; SUBCUTANEOUS ONCE
Refills: 0 | Status: COMPLETED | OUTPATIENT
Start: 2024-09-17 | End: 2024-09-17

## 2024-09-17 RX ADMIN — REGADENOSON 0.4 MILLIGRAM(S): 0.08 INJECTION, SOLUTION INTRAVENOUS at 14:22

## 2024-09-17 RX ADMIN — NIFEDIPINE 30 MILLIGRAM(S): 60 TABLET, FILM COATED, EXTENDED RELEASE ORAL at 17:52

## 2024-09-17 RX ADMIN — Medication 25 MILLIGRAM(S): at 21:08

## 2024-09-17 RX ADMIN — SODIUM CHLORIDE 250 MILLILITER(S): 9 INJECTION INTRAMUSCULAR; INTRAVENOUS; SUBCUTANEOUS at 21:33

## 2024-09-17 RX ADMIN — Medication 25 MILLIGRAM(S): at 11:16

## 2024-09-17 RX ADMIN — Medication 25 MILLIGRAM(S): at 05:06

## 2024-09-17 RX ADMIN — LOSARTAN POTASSIUM 50 MILLIGRAM(S): 50 TABLET ORAL at 17:52

## 2024-09-17 RX ADMIN — NIFEDIPINE 60 MILLIGRAM(S): 60 TABLET, FILM COATED, EXTENDED RELEASE ORAL at 05:05

## 2024-09-17 RX ADMIN — LOSARTAN POTASSIUM 50 MILLIGRAM(S): 50 TABLET ORAL at 05:06

## 2024-09-17 NOTE — PROGRESS NOTE ADULT - NSPROGADDITIONALINFOA_GEN_ALL_CORE
#Progress Note Handoff  Pending (specify): nst, d/c planning  Family discussion: d/w pt at bedside  Disposition: home

## 2024-09-17 NOTE — PROGRESS NOTE ADULT - PROBLEM SELECTOR PLAN 1
improving, in setting of HTN urgency  resolving  started on hctz 25  nifedipine 60 increase to 90, cont hydralazine 25 tid; losartan 50 bid  tte with preserved ef, grade I diastolic dysfunction  ekg with first degree avb, L ant fascicular block  kidney duplex with L stenosis 60%  NST  cth neg  mri brain neg  discharge planning

## 2024-09-17 NOTE — DISCHARGE NOTE PROVIDER - NPI NUMBER (FOR SYSADMIN USE ONLY) :
[[id number]] Hydroxychloroquine Pregnancy And Lactation Text: This medication has been shown to cause fetal harm but it isn't assigned a Pregnancy Risk Category. There are small amounts excreted in breast milk.

## 2024-09-17 NOTE — DISCHARGE NOTE PROVIDER - HOSPITAL COURSE
69-year-old male with a past medical history of PCKD, Hx of Bladder Ca s/p Resection, BPH,  and HTN, presents to ED for evaluation of elevated blood pressure and headache. Patient reports that around 5:30 PM while he was watching TV he developed a headache to the bilateral temples, checked his blood pressure which was 210/120 and took Benicar (states this is a medication he was prescribed >20 yrs ago but no longer takes). Patient reports his blood pressure is normally 140/90.  . Patient reports intermittent left arm tingling x 1 week and episode of B/L L swelling last week that resolved on its own.  Patient reports he is taking Hydralazine 25 TID, Lisinopril 5mg, and Losartan 50mg daily and is compliant with this medication.  Patient reports he has an appointment with the nephrologist for the first time on September 18 in [place]. On arrival to ED, BP was 216/110 and patient was started on Nicardipine ggt.  Patient denies visual changes, ringing in the ears, recent trauma, use of blood thinners, weakness, numbness, urinary or bowel retention or incontinence, chest pain, shortness of breath, abdominal pain, nausea, vomiting, diarrhea, or constipation.    In ED:   EKG: Sinus bradycardia with first-degree AV block.  Heart rate 57. Left anterior fascicular block Left ventricular hypertrophy ( R in aVL , [place] product , Romhilt-Ybarra )  Labs: Trop 22> 22> 23   CT Head: No evidence of acute intracranial pathology.    Pt was treated with nicardipine gtt and admitted to telemetry for hypertensive emergency     Hospital course: pt was started on Hydralazine 25 TID, Lisinopril 5mg, and Losartan 50mg bid. /86 HR 49. KBUS showed non-obstructing calculi. On today' sexam, pt has no acute complaints. Pt is stable for dc.      #HTN Emergency  with headache and L arm tingling  #Polycystic Kidney disease   -/110 on presentation   -CT Head neg  -Trop 22> 22> 23   -EKG: Sinus bradycardia with first-degree AV block.  Heart rate 57. Left anterior fascicular block Left ventricular hypertrophy ( R in aVL , [place] product , Romhilt-Ybarra )  -s/p Nicardipine ggt   - Patient reports he is taking Hydralazine 25 TID, Lisinopril 5mg, and Losartan 50mg daily  -stop Lisinopril, start Nifedipine 60mg, Losartan 50mg BID,  continue Hydralazine 25mg TID, started on hydrochlorothiazide 25 mg PO OD   -Monitor BP  -Check Echo  Left ventricular ejection fraction, by visual estimation, is 60 to   65%.  given fascicular block and reported episode of B/L leg swelling 1 week ago   -A1C, Lipid profile , TSH      #PCKD  #Renal calculi  #Hx  BPH   -Cr 1.3   -RBUS : Multiple renal cysts bilaterally. Multiple nonobstructing left renal calculi largest   measuring up to 0.5 cm in the lower pole. No hydronephrosis. Enlarged prostate gland with mass effect on the bladder base.  -pt follows closely with outpt Urologist, needs outpt fu after dc  -Nephro consult Dr. Hilliard      #Hx of Bladder Ca s/p Resection   -pt follows closely with outpt urologist   -outpt Fu    69-year-old male with a past medical history of PCKD, Hx of Bladder Ca s/p Resection, BPH,  and HTN, presents to ED for evaluation of elevated blood pressure and headache. Patient reports that around 5:30 PM while he was watching TV he developed a headache to the bilateral temples, checked his blood pressure which was 210/120 and took Benicar (states this is a medication he was prescribed >20 yrs ago but no longer takes). Patient reports his blood pressure is normally 140/90.  . Patient reports intermittent left arm tingling x 1 week and episode of B/L L swelling last week that resolved on its own.  Patient reports he is taking Hydralazine 25 TID, Lisinopril 5mg, and Losartan 50mg daily and is compliant with this medication.  Patient reports he has an appointment with the nephrologist for the first time on September 18 in [place]. On arrival to ED, BP was 216/110 and patient was started on Nicardipine ggt.  Patient denies visual changes, ringing in the ears, recent trauma, use of blood thinners, weakness, numbness, urinary or bowel retention or incontinence, chest pain, shortness of breath, abdominal pain, nausea, vomiting, diarrhea, or constipation.    In ED:   EKG: Sinus bradycardia with first-degree AV block.  Heart rate 57. Left anterior fascicular block Left ventricular hypertrophy ( R in aVL , [place] product , Romhilt-Ybarra )  Labs: Trop 22> 22> 23   CT Head: No evidence of acute intracranial pathology.    Pt was treated with nicardipine gtt and admitted to telemetry for hypertensive emergency     Hospital course: pt was started on Hydralazine 25 TID, Lisinopril 5mg, and Losartan 50mg bid. /86 HR 49. KBUS showed non-obstructing calculi. Renal artery duplex showed  Greater than 60% stenosis of the left renal artery. Stress test showed     On today' sexam, pt has no acute complaints. Pt is stable for dc.      #HTN Emergency  with headache and L arm tingling  #Polycystic Kidney disease   -/110 on presentation   -CT Head neg  -Trop 22> 22> 23   -EKG: Sinus bradycardia with first-degree AV block.  Heart rate 57. Left anterior fascicular block Left ventricular hypertrophy ( R in aVL , Steve product , Romhilt-Ybarra )  -s/p Nicardipine ggt   - Patient reports he is taking Hydralazine 25 TID, Lisinopril 5mg, and Losartan 50mg daily  -stop Lisinopril, start Nifedipine 60mg, Losartan 50mg BID,  continue Hydralazine 25mg TID, started on hydrochlorothiazide 25 mg PO OD   -Monitor BP  -Check Echo  Left ventricular ejection fraction, by visual estimation, is 60 to   65%.  given fascicular block and reported episode of B/L leg swelling 1 week ago   -A1C, Lipid profile , TSH      #PCKD  #Renal calculi  #Hx  BPH   -Cr 1.3   -RBUS : Multiple renal cysts bilaterally. Multiple nonobstructing left renal calculi largest   measuring up to 0.5 cm in the lower pole. No hydronephrosis. Enlarged prostate gland with mass effect on the bladder base.  -pt follows closely with outpt Urologist, needs outpt fu after dc  -Nephro consult Dr. Hilliard      #Hx of Bladder Ca s/p Resection   -pt follows closely with outpt urologist   -outpt Fu

## 2024-09-17 NOTE — DISCHARGE NOTE NURSING/CASE MANAGEMENT/SOCIAL WORK - PATIENT PORTAL LINK FT
You can access the FollowMyHealth Patient Portal offered by [facility] by registering at the following website: [email or web address]. By joining [facility]’s FollowMyHealth portal, you will also be able to view your health information using other applications (apps) compatible with our system.

## 2024-09-17 NOTE — DISCHARGE NOTE PROVIDER - PROVIDER TOKENS
PROVIDER:[TOKEN:[[id number]],FOLLOWUP:[1 week]],PROVIDER:[TOKEN:[[id number]],FOLLOWUP:[1 week]]

## 2024-09-17 NOTE — PROGRESS NOTE ADULT - ASSESSMENT
69-year-old male with a past medical history of PCKD , Hx of Bladder Ca s/p Resection, BPH,  and HTN,  presents to ED for evaluation of elevated blood pressure and headache. Patient admitted to telemetry for hypertensive emergency.    #HTN Emergency  with headache and L arm tingling  #Polycystic Kidney disease   -/110 on presentation   -CT Head neg  -Trop 22> 22> 23   -EKG: Sinus bradycardia with first-degree AV block.  Heart rate 57. Left anterior fascicular block Left ventricular hypertrophy ( R in aVL , [place] product , Romhilt-Ybarra )  -s/p Nicardipine ggt   - Patient reports he is taking Hydralazine 25 TID, Lisinopril 5mg, and Losartan 50mg daily  -stop Lisinopril, start Nifedipine 60mg, Losartan 50mg BID,  continue Hydralazine 25mg TID, started on hydrochlorothiazide 25 mg PO OD   -Monitor BP  -Check Echo  Left ventricular ejection fraction, by visual estimation, is 60 to   65%.  given fascicular block and reported episode of B/L leg swelling 1 week ago   -A1C, Lipid profile , TSH      #PCKD  #Renal calculi  #Hx  BPH   -Cr 1.3   -RBUS : Multiple renal cysts bilaterally. Multiple nonobstructing left renal calculi largest   measuring up to 0.5 cm in the lower pole. No hydronephrosis. Enlarged prostate gland with mass effect on the bladder base.  -pt follows closely with outpt Urologist, needs outpt fu after dc  -Nephro consult Dr. Hilliard      #Hx of Bladder Ca s/p Resection   -pt follows closely with outpt urologist   -outpt Fu       #DVT ppx- Lovenox  #Diet-dash/tlc |  #Activity- IAT  #Dispo-24- 48 hrs     Pending: BP control , repeat EKG, Echo, Nephro  
HTN urgency with HA  - symptoms and BP's improving  CKD stage 3  hx of proteinuria  hx of b/l multicystic kidneys, r/o PCKD, though no Family hx  multiple non-obstructing left renal stones  H/O bladder Ca with BCG therapy. Undergoes surveillance cystoscopies.  BPH / prostatomegaly  EF 60-65% / mod LAE / diastolic dysfunction / 1 degree HB    Plan:    cont losartan 50mg po bid  cont nifedipine xl 60mg po qd  cont hydralazine 25mg po tid  add hctz 25mg po qd  check renal artery duplex  quantify proteinuria, check ua and uprot/cr  monitor BP's  d/w wife on phone  full code
HTN urgency, better  CKD stage 3  hx of proteinuria  hx of b/l multicystic kidneys, r/o PCKD, though no Family hx  multiple non-obstructing left renal stones  60% left renal artery stenosis on duplex  H/O bladder Ca with BCG therapy. Undergoes surveillance cystoscopies.  BPH / prostatomegaly  EF 60-65% / mod LAE / diastolic dysfunction / 1 degree HB    Plan:    cont losartan 50mg po bid  increase nifedipine xl 90mg po qd  cont hydralazine 25mg po tid  cont hctz 25mg po qd  CTA renal arteries to r/o left renal artery stenosis (can be done as outpatient if pt being dc'ed home)  quantify proteinuria, check ua and uprot/cr, still pending  outpt renal f/u  d/w resident
69M PMHx polycystic kidney disease, bladder ca s/p resection, BPH, HTN here with headache, found to have HTN urgency.

## 2024-09-17 NOTE — PROGRESS NOTE ADULT - SUBJECTIVE AND OBJECTIVE BOX
NEPHROLOGY FOLLOW UP NOTE    BP's improving  HA improved     PAST MEDICAL & SURGICAL HISTORY:  No pertinent past medical history      No significant past surgical history        Allergies:  No Known Allergies    Home Medications Reviewed    SOCIAL HISTORY:  Denies ETOH,Smoking,   FAMILY HISTORY:  No pertinent family history in first degree relatives          REVIEW OF SYSTEMS:  CONSTITUTIONAL: No weakness, fevers or chills  EYES/ENT: No visual changes;  No vertigo or throat pain   NECK: No pain or stiffness  RESPIRATORY: No cough, wheezing, hemoptysis; No shortness of breath  CARDIOVASCULAR: No chest pain or palpitations.  GASTROINTESTINAL: No abdominal or epigastric pain. No nausea, vomiting, or hematemesis; No diarrhea or constipation. No melena or hematochezia.  GENITOURINARY: No dysuria, frequency, foamy urine, urinary urgency, incontinence or hematuria  NEUROLOGICAL: No numbness or weakness  SKIN: No itching, burning, rashes, or lesions   VASCULAR: No bilateral lower extremity edema.   All other review of systems is negative unless indicated above.    PHYSICAL EXAM:  Constitutional: NAD  HEENT: anicteric sclera, oropharynx clear, MMM  Neck: No JVD  Respiratory: CTAB, no wheezes, rales or rhonchi  Cardiovascular: S1, S2, RRR  Gastrointestinal: BS+, soft, NT/ND  Extremities: No cyanosis or clubbing. No peripheral edema  Neurological: A/O x 3, no focal deficits  Psychiatric: Normal mood, normal affect  : No CVA tenderness. No beltrán.   Skin: No rashes    Hospital Medications:   MEDICATIONS  (STANDING):  enoxaparin Injectable 40 milliGRAM(s) SubCutaneous every 24 hours  hydrALAZINE 25 milliGRAM(s) Oral three times a day  hydrochlorothiazide 25 milliGRAM(s) Oral daily  losartan 50 milliGRAM(s) Oral every 12 hours  NIFEdipine XL 60 milliGRAM(s) Oral daily        VITALS:  T(F): 98.2 (09-16-24 @ 07:54), Max: 98.2 (09-16-24 @ 07:54)  HR: 54 (09-16-24 @ 07:54)  BP: 160/91 (09-16-24 @ 07:54)  RR: 18 (09-16-24 @ 07:54)  SpO2: 97% (09-16-24 @ 07:54)  Wt(kg): --      Weight (kg): 90.7 (09-15 @ 12:14)    LABS:  09-16    142  |  107  |  18  ----------------------------<  117<H>  4.9   |  26  |  1.3    Ca    9.4      16 Sep 2024 08:02  Phos  3.1     09-16  Mg     1.9     09-16    TPro  7.0  /  Alb  4.3  /  TBili  0.4  /  DBili      /  AST  16  /  ALT  14  /  AlkPhos  61  09-16                          15.2   7.92  )-----------( 238      ( 16 Sep 2024 08:02 )             46.4       Urine Studies:  Urinalysis Basic - ( 16 Sep 2024 08:02 )    Color:  / Appearance:  / SG:  / pH:   Gluc: 117 mg/dL / Ketone:   / Bili:  / Urobili:    Blood:  / Protein:  / Nitrite:    Leuk Esterase:  / RBC:  / WBC    Sq Epi:  / Non Sq Epi:  / Bacteria:           RADIOLOGY & ADDITIONAL STUDIES:  
NEPHROLOGY FOLLOW UP NOTE    pt seen ambulating about unit  no complaints    PAST MEDICAL & SURGICAL HISTORY:  No pertinent past medical history      No significant past surgical history        Allergies:  No Known Allergies    Home Medications Reviewed    SOCIAL HISTORY:  Denies ETOH,Smoking,   FAMILY HISTORY:  No pertinent family history in first degree relatives          REVIEW OF SYSTEMS:  CONSTITUTIONAL: No weakness, fevers or chills  EYES/ENT: No visual changes;  No vertigo or throat pain   NECK: No pain or stiffness  RESPIRATORY: No cough, wheezing, hemoptysis; No shortness of breath  CARDIOVASCULAR: No chest pain or palpitations.  GASTROINTESTINAL: No abdominal or epigastric pain. No nausea, vomiting, or hematemesis; No diarrhea or constipation. No melena or hematochezia.  GENITOURINARY: No dysuria, frequency, foamy urine, urinary urgency, incontinence or hematuria  NEUROLOGICAL: No numbness or weakness  SKIN: No itching, burning, rashes, or lesions   VASCULAR: No bilateral lower extremity edema.   All other review of systems is negative unless indicated above.    PHYSICAL EXAM:  Constitutional: NAD  HEENT: anicteric sclera, oropharynx clear, MMM  Neck: No JVD  Respiratory: CTAB, no wheezes, rales or rhonchi  Cardiovascular: S1, S2, RRR  Gastrointestinal: BS+, soft, NT/ND  Extremities: No cyanosis or clubbing. No peripheral edema  Neurological: A/O x 3, no focal deficits  Psychiatric: Normal mood, normal affect  : No CVA tenderness. No beltrán.   Skin: No rashes    Hospital Medications:   MEDICATIONS  (STANDING):  enoxaparin Injectable 40 milliGRAM(s) SubCutaneous every 24 hours  hydrALAZINE 25 milliGRAM(s) Oral three times a day  hydrochlorothiazide 25 milliGRAM(s) Oral daily  losartan 50 milliGRAM(s) Oral every 12 hours  NIFEdipine XL 60 milliGRAM(s) Oral daily  NIFEdipine XL 30 milliGRAM(s) Oral once  regadenoson Injectable 0.4 milliGRAM(s) IV Push once        VITALS:  T(F): 97.6 (09-17-24 @ 13:20), Max: 98.1 (09-16-24 @ 15:47)  HR: 60 (09-17-24 @ 13:20)  BP: 154/89 (09-17-24 @ 13:20)  RR: 18 (09-17-24 @ 13:20)  SpO2: 97% (09-16-24 @ 20:58)  Wt(kg): --    09-16 @ 07:01  -  09-17 @ 07:00  --------------------------------------------------------  IN: 25 mL / OUT: 400 mL / NET: -375 mL    09-17 @ 07:01  -  09-17 @ 13:51  --------------------------------------------------------  IN: 0 mL / OUT: 1950 mL / NET: -1950 mL          LABS:  09-17    142  |  107  |  19  ----------------------------<  97  4.0   |  24  |  1.3    Ca    8.9      17 Sep 2024 04:57  Phos  3.1     09-16  Mg     1.9     09-17    TPro  7.0  /  Alb  4.3  /  TBili  0.4  /  DBili      /  AST  16  /  ALT  14  /  AlkPhos  61  09-16                          14.4   8.13  )-----------( 275      ( 17 Sep 2024 04:57 )             43.4       Urine Studies:  Urinalysis Basic - ( 17 Sep 2024 04:57 )    Color:  / Appearance:  / SG:  / pH:   Gluc: 97 mg/dL / Ketone:   / Bili:  / Urobili:    Blood:  / Protein:  / Nitrite:    Leuk Esterase:  / RBC:  / WBC    Sq Epi:  / Non Sq Epi:  / Bacteria:           RADIOLOGY & ADDITIONAL STUDIES:    
SUBJECTIVE/OVERNIGHT EVENTS  Today is hospital day 1d. This morning patient was seen and examined at bedside, resting comfortably in bed. No acute or major events overnight. Reports improvement in headache, no more arm tingling      HOSPITAL COURSE  Day 1:   Day 2:   Day 3:     CODE STATUS:    FAMILY COMMUNICATION  Contact date:  Name of person contacted:  Relationship to patient:  Communication details:    MEDICATIONS  STANDING MEDICATIONS  enoxaparin Injectable 40 milliGRAM(s) SubCutaneous every 24 hours  hydrALAZINE 25 milliGRAM(s) Oral three times a day  hydrochlorothiazide 25 milliGRAM(s) Oral daily  losartan 50 milliGRAM(s) Oral every 12 hours  NIFEdipine XL 60 milliGRAM(s) Oral daily    PRN MEDICATIONS    VITALS  T(F): 98.2 (09-16-24 @ 07:54), Max: 98.2 (09-16-24 @ 07:54)  HR: 54 (09-16-24 @ 07:54) (49 - 62)  BP: 160/91 (09-16-24 @ 07:54) (124/65 - 162/82)  RR: 18 (09-16-24 @ 07:54) (18 - 18)  SpO2: 97% (09-16-24 @ 07:54) (97% - 97%)    PHYSICAL EXAM  GENERAL  (  X) NAD, lying in bed comfortably     (  ) obtunded     (  ) lethargic     (  ) somnolent    HEAD  ( X ) Atraumatic     (  ) hematoma     (  ) laceration (specify location:       )     NECK  ( X ) Supple     (  ) neck stiffness     (  ) nuchal rigidity     (  )  no JVD     (  ) JVD present ( -- cm)    HEART  Rate -->  ( X ) normal rate    (  ) bradycardic    (  ) tachycardic  Rhythm -->  ( X ) regular    (  ) regularly irregular    (  ) irregularly irregular  Murmurs -->  (X  ) normal s1/s2    (  ) systolic murmur    (  ) diastolic murmur    (  ) continuous murmur     (  ) S3 present    (  ) S4 present    LUNGS  (X  )Unlabored respirations     (  ) tachypnea  (X  ) B/L air entry     (  ) decreased breath sounds in:  (location     )    (X  ) no adventitious sound     (  ) crackles     (  ) wheezing      (  ) rhonchi      (specify location:       )  (  ) chest wall tenderness (specify location:       )    ABDOMEN  ( X ) Soft     (  ) tense   |   (  ) nondistended     (  ) distended   |   (X  ) +BS     (  ) hypoactive bowel sounds     (  ) hyperactive bowel sounds  ( X ) nontender     (  ) RUQ tenderness     (  ) RLQ tenderness     (  ) LLQ tenderness     (  ) epigastric tenderness     (  ) diffuse tenderness  (  ) Splenomegaly      (  ) Hepatomegaly      (  ) Jaundice     (  ) ecchymosis     EXTREMITIES  (X  ) Normal     (  ) Rash     (  ) ecchymosis     (  ) varicose veins      (  ) pitting edema     (  ) non-pitting edema   (  ) ulceration     (  ) gangrene:     (location:     )    NERVOUS SYSTEM  (X  ) A&Ox3     (  ) confused     (  ) lethargic  CN II-XII:     (  ) Intact     (  ) focal deficits  (Specify:     )   Upper extremities:     (  ) strength X/5     (  ) focal deficit (specify:    )  Lower extremities:     (  ) strength  X/5    (  ) focal deficit (specify:    )    SKIN  (X  ) No rashes or lesions     (  ) maculopapular rash     (  ) pustules     (  ) vesicles     (  ) ulcer     (  ) ecchymosis     (specify location:     )    (  ) Indwelling [place] Catheter   Date insterted:    Reason (  ) Critical illness     (  ) urinary retention    (  ) Accurate Ins/Outs Monitoring     (  ) CMO patient    (  ) Central Line  Date inserted:  Location: (  ) Right IJ   (  ) Left IJ   (  ) Right Fem   (  ) Left Fem    (  ) SPC  (  ) pigtail  (  ) PEG tube  (  ) colostomy  (  ) jejunostomy  (  ) U-Dall    LABS             15.2   7.92  )-----------( 238      ( 09-16-24 @ 08:02 )             46.4     142  |  107  |  18  -------------------------<  117   09-16-24 @ 08:02  4.9  |  26  |  1.3    Ca      9.4     09-16-24 @ 08:02  Phos   3.1     09-16-24 @ 08:02  Mg     1.9     09-16-24 @ 08:02    TPro  7.0  /  Alb  4.3  /  TBili  0.4  /  DBili  x   /  AST  16  /  ALT  14  /  AlkPhos  61  /  GGT  x     09-16-24 @ 08:02      Troponin T, High Sensitivity Result: 21 ng/L (09-15-24 @ 11:08)  Troponin T, High Sensitivity Result: 23 ng/L (09-15-24 @ 06:17)  Troponin T, High Sensitivity Result: 22 ng/L (09-15-24 @ 00:00)    Urinalysis Basic - ( 16 Sep 2024 08:02 )    Color: x / Appearance: x / SG: x / pH: x  Gluc: 117 mg/dL / Ketone: x  / Bili: x / Urobili: x   Blood: x / Protein: x / Nitrite: x   Leuk Esterase: x / RBC: x / WBC x   Sq Epi: x / Non Sq Epi: x / Bacteria: x          IMAGING
INTERVAL HPI/OVERNIGHT EVENTS:    SUBJECTIVE: Patient seen and examined at bedside.     no cp, sob, abd pain, fever  no cp, palpitations, larkin, orthopnea    OBJECTIVE:    VITAL SIGNS:  Vital Signs Last 24 Hrs  T(C): 36.4 (17 Sep 2024 13:20), Max: 36.7 (16 Sep 2024 15:47)  T(F): 97.6 (17 Sep 2024 13:20), Max: 98.1 (16 Sep 2024 15:47)  HR: 60 (17 Sep 2024 13:20) (49 - 70)  BP: 154/89 (17 Sep 2024 13:20) (151/86 - 166/86)  BP(mean): 113 (17 Sep 2024 04:39) (112 - 118)  RR: 18 (17 Sep 2024 13:20) (18 - 18)  SpO2: 97% (16 Sep 2024 20:58) (97% - 97%)    Parameters below as of 16 Sep 2024 15:47  Patient On (Oxygen Delivery Method): room air          PHYSICAL EXAM:    General: NAD  HEENT: NC/AT; PERRL, clear conjunctiva  Neck: supple  Respiratory: CTA b/l  Cardiovascular: +S1/S2; RRR  Abdomen: soft, NT/ND; +BS x4  Extremities: WWP, 2+ peripheral pulses b/l; no LE edema  Skin: normal color and turgor; no rash  Neurological:    MEDICATIONS:  MEDICATIONS  (STANDING):  enoxaparin Injectable 40 milliGRAM(s) SubCutaneous every 24 hours  hydrALAZINE 25 milliGRAM(s) Oral three times a day  hydrochlorothiazide 25 milliGRAM(s) Oral daily  losartan 50 milliGRAM(s) Oral every 12 hours  NIFEdipine XL 60 milliGRAM(s) Oral daily  NIFEdipine XL 30 milliGRAM(s) Oral once  regadenoson Injectable 0.4 milliGRAM(s) IV Push once    MEDICATIONS  (PRN):      ALLERGIES:  Allergies    No Known Allergies    Intolerances        LABS:                        14.4   8.13  )-----------( 275      ( 17 Sep 2024 04:57 )             43.4     Hemoglobin: 14.4 g/dL (09-17 @ 04:57)  Hemoglobin: 15.2 g/dL (09-16 @ 08:02)  Hemoglobin: 14.1 g/dL (09-15 @ 06:17)  Hemoglobin: 13.9 g/dL (09-14 @ 21:20)    CBC Full  -  ( 17 Sep 2024 04:57 )  WBC Count : 8.13 K/uL  RBC Count : 4.72 M/uL  Hemoglobin : 14.4 g/dL  Hematocrit : 43.4 %  Platelet Count - Automated : 275 K/uL  Mean Cell Volume : 91.9 fL  Mean Cell Hemoglobin : 30.5 pg  Mean Cell Hemoglobin Concentration : 33.2 g/dL  Auto Neutrophil # : 4.71 K/uL  Auto Lymphocyte # : 2.20 K/uL  Auto Monocyte # : 0.97 K/uL  Auto Eosinophil # : 0.16 K/uL  Auto Basophil # : 0.05 K/uL  Auto Neutrophil % : 57.9 %  Auto Lymphocyte % : 27.1 %  Auto Monocyte % : 11.9 %  Auto Eosinophil % : 2.0 %  Auto Basophil % : 0.6 %    09-17    142  |  107  |  19  ----------------------------<  97  4.0   |  24  |  1.3    Ca    8.9      17 Sep 2024 04:57  Phos  3.1     09-16  Mg     1.9     09-17    TPro  7.0  /  Alb  4.3  /  TBili  0.4  /  DBili  x   /  AST  16  /  ALT  14  /  AlkPhos  61  09-16    Creatinine Trend: 1.3<--, 1.3<--, 1.3<--, 1.3<--  LIVER FUNCTIONS - ( 16 Sep 2024 08:02 )  Alb: 4.3 g/dL / Pro: 7.0 g/dL / ALK PHOS: 61 U/L / ALT: 14 U/L / AST: 16 U/L / GGT: x               hs Troponin:            Urinalysis Basic - ( 17 Sep 2024 04:57 )    Color: x / Appearance: x / SG: x / pH: x  Gluc: 97 mg/dL / Ketone: x  / Bili: x / Urobili: x   Blood: x / Protein: x / Nitrite: x   Leuk Esterase: x / RBC: x / WBC x   Sq Epi: x / Non Sq Epi: x / Bacteria: x      CSF:                      EKG:   MICROBIOLOGY:    IMAGING:      Labs, imaging, EKG personally reviewed    RADIOLOGY & ADDITIONAL TESTS: Reviewed.

## 2024-09-17 NOTE — DISCHARGE NOTE PROVIDER - NSDCCPCAREPLAN_GEN_ALL_CORE_FT
PRINCIPAL DISCHARGE DIAGNOSIS  Diagnosis: Hypertensive emergency  Assessment and Plan of Treatment: You were admitted because of headache. You were found to have high blood pressure and were started on medications. Ultrasound of the kidjose carlos showed posssible stenosis of the left kidney artery. After discharge, please follow up with your kidney doctor and primary care doctor for that. And please take medications as prescribed.

## 2024-09-24 DIAGNOSIS — Q61.2 POLYCYSTIC KIDNEY, ADULT TYPE: ICD-10-CM

## 2024-09-24 DIAGNOSIS — N20.0 CALCULUS OF KIDNEY: ICD-10-CM

## 2024-09-24 DIAGNOSIS — N40.0 BENIGN PROSTATIC HYPERPLASIA WITHOUT LOWER URINARY TRACT SYMPTOMS: ICD-10-CM

## 2024-09-24 DIAGNOSIS — Z85.51 PERSONAL HISTORY OF MALIGNANT NEOPLASM OF BLADDER: ICD-10-CM

## 2024-09-24 DIAGNOSIS — R00.1 BRADYCARDIA, UNSPECIFIED: ICD-10-CM

## 2024-09-24 DIAGNOSIS — I44.0 ATRIOVENTRICULAR BLOCK, FIRST DEGREE: ICD-10-CM

## 2024-09-24 DIAGNOSIS — I70.1 ATHEROSCLEROSIS OF RENAL ARTERY: ICD-10-CM

## 2024-09-24 DIAGNOSIS — I44.4 LEFT ANTERIOR FASCICULAR BLOCK: ICD-10-CM

## 2024-09-24 DIAGNOSIS — I12.9 HYPERTENSIVE CHRONIC KIDNEY DISEASE WITH STAGE 1 THROUGH STAGE 4 CHRONIC KIDNEY DISEASE, OR UNSPECIFIED CHRONIC KIDNEY DISEASE: ICD-10-CM

## 2025-04-08 ENCOUNTER — APPOINTMENT (OUTPATIENT)
Dept: OTOLARYNGOLOGY | Facility: CLINIC | Age: 70
End: 2025-04-08

## 2025-04-08 DIAGNOSIS — H72.92 UNSPECIFIED PERFORATION OF TYMPANIC MEMBRANE, LEFT EAR: ICD-10-CM

## 2025-04-08 DIAGNOSIS — H90.3 SENSORINEURAL HEARING LOSS, BILATERAL: ICD-10-CM

## 2025-04-08 DIAGNOSIS — H61.21 IMPACTED CERUMEN, RIGHT EAR: ICD-10-CM

## 2025-04-08 DIAGNOSIS — H73.22 UNSPECIFIED MYRINGITIS, LEFT EAR: ICD-10-CM

## 2025-04-08 DIAGNOSIS — H93.8X3 OTHER SPECIFIED DISORDERS OF EAR, BILATERAL: ICD-10-CM

## 2025-04-08 PROCEDURE — 92557 COMPREHENSIVE HEARING TEST: CPT

## 2025-04-08 PROCEDURE — 99214 OFFICE O/P EST MOD 30 MIN: CPT | Mod: 25

## 2025-04-08 PROCEDURE — G0268 REMOVAL OF IMPACTED WAX MD: CPT

## 2025-04-08 PROCEDURE — 92567 TYMPANOMETRY: CPT

## 2025-04-09 RX ORDER — CIPROFLOXACIN AND DEXAMETHASONE 3; 1 MG/ML; MG/ML
0.3-0.1 SUSPENSION/ DROPS AURICULAR (OTIC)
Qty: 2 | Refills: 2 | Status: ACTIVE | COMMUNITY
Start: 2025-04-08 | End: 1900-01-01

## 2025-05-09 ENCOUNTER — APPOINTMENT (OUTPATIENT)
Dept: OTOLARYNGOLOGY | Facility: CLINIC | Age: 70
End: 2025-05-09